# Patient Record
Sex: FEMALE | Race: BLACK OR AFRICAN AMERICAN | URBAN - METROPOLITAN AREA
[De-identification: names, ages, dates, MRNs, and addresses within clinical notes are randomized per-mention and may not be internally consistent; named-entity substitution may affect disease eponyms.]

---

## 2021-02-15 ENCOUNTER — TELEPHONE (OUTPATIENT)
Dept: OBGYN CLINIC | Facility: CLINIC | Age: 36
End: 2021-02-15

## 2021-02-15 NOTE — TELEPHONE ENCOUNTER
Patient is 20 weeks along, has had a change in insurance. lmp 9/2020. Old provider is no longer part of your insurance network. Asking to come to Dr. Angélica Mueller. Please advise.

## 2021-02-15 NOTE — TELEPHONE ENCOUNTER
Pt is 21w3d VARGAS 6/25/2021 and looking to transfer PN care. Pt agrees to seeing all 5 Providers. Advised pt of transfer process. Advised pt to continue care with current OB. Advised pt all office notes, lab results, and ultrasound results needs to be faxed.

## 2021-02-19 NOTE — TELEPHONE ENCOUNTER
Pt informed we cannot take her on as a pt. Pt advised to check with insurance to find a tertiary level facility. Pt states she has a copy of her records so we can destroy the copy we have.

## 2021-02-19 NOTE — TELEPHONE ENCOUNTER
Lmtcb. Physicians reviewed records and PN transfer denied. Due to abdominal cerclage with current pregnancy pt needs PN care with tertiary level facility. Does pt want records back?